# Patient Record
Sex: MALE | Race: WHITE
[De-identification: names, ages, dates, MRNs, and addresses within clinical notes are randomized per-mention and may not be internally consistent; named-entity substitution may affect disease eponyms.]

---

## 2022-02-24 ENCOUNTER — HOSPITAL ENCOUNTER (OUTPATIENT)
Dept: HOSPITAL 47 - LABWHC1 | Age: 22
Discharge: HOME | End: 2022-02-24
Attending: INTERNAL MEDICINE
Payer: COMMERCIAL

## 2022-02-24 DIAGNOSIS — Z00.00: Primary | ICD-10-CM

## 2022-02-24 LAB
ALBUMIN SERPL-MCNC: 5 G/DL (ref 3.8–4.9)
ALBUMIN/GLOB SERPL: 1.99 G/DL (ref 1.6–3.17)
ALP SERPL-CCNC: 60 U/L (ref 41–126)
ALT SERPL-CCNC: 35 U/L (ref 10–49)
ANION GAP SERPL CALC-SCNC: 14.4 MMOL/L (ref 10–18)
AST SERPL-CCNC: 25 U/L (ref 14–35)
BASOPHILS # BLD AUTO: 0.03 X 10*3/UL (ref 0–0.1)
BASOPHILS NFR BLD AUTO: 0.5 %
BUN SERPL-SCNC: 8.6 MG/DL (ref 9–27)
BUN/CREAT SERPL: 10.37 RATIO (ref 12–20)
CALCIUM SPEC-MCNC: 10 MG/DL (ref 8.7–10.3)
CHLORIDE SERPL-SCNC: 100 MMOL/L (ref 96–109)
CHOLEST SERPL-MCNC: 160 MG/DL (ref 0–200)
CO2 SERPL-SCNC: 23.8 MMOL/L (ref 20–27.5)
EOSINOPHIL # BLD AUTO: 0.09 X 10*3/UL (ref 0.04–0.35)
EOSINOPHIL NFR BLD AUTO: 1.4 %
ERYTHROCYTE [DISTWIDTH] IN BLOOD BY AUTOMATED COUNT: 5.29 X 10*6/UL (ref 4.4–5.6)
ERYTHROCYTE [DISTWIDTH] IN BLOOD: 13 % (ref 11.5–14.5)
GLOBULIN SER CALC-MCNC: 2.5 G/DL (ref 1.6–3.3)
GLUCOSE SERPL-MCNC: 87 MG/DL (ref 70–110)
HCT VFR BLD AUTO: 46.1 % (ref 39.6–50)
HDLC SERPL-MCNC: 29.1 MG/DL (ref 40–60)
HGB BLD-MCNC: 15.7 G/DL (ref 13–17)
IMM GRANULOCYTES BLD QL AUTO: 0.2 %
LDLC SERPL CALC-MCNC: 115.1 MG/DL (ref 0–131)
LYMPHOCYTES # SPEC AUTO: 2.58 X 10*3/UL (ref 0.9–5)
LYMPHOCYTES NFR SPEC AUTO: 39 %
MCH RBC QN AUTO: 29.7 PG (ref 27–32)
MCHC RBC AUTO-ENTMCNC: 34.1 G/DL (ref 32–37)
MCV RBC AUTO: 87.1 FL (ref 80–97)
MONOCYTES # BLD AUTO: 0.64 X 10*3/UL (ref 0.2–1)
MONOCYTES NFR BLD AUTO: 9.7 %
NEUTROPHILS # BLD AUTO: 3.27 X 10*3/UL (ref 1.8–7.7)
NEUTROPHILS NFR BLD AUTO: 49.2 %
NRBC BLD AUTO-RTO: 0 /100 WBCS (ref 0–0)
PLATELET # BLD AUTO: 289 X 10*3/UL (ref 140–440)
POTASSIUM SERPL-SCNC: 3.9 MMOL/L (ref 3.5–5.5)
PROT SERPL-MCNC: 7.5 G/DL (ref 6.2–8.2)
SODIUM SERPL-SCNC: 138 MMOL/L (ref 135–145)
TRIGL SERPL-MCNC: 78.9 MG/DL (ref 0–149)
VLDLC SERPL CALC-MCNC: 15.78 MG/DL (ref 5–40)
WBC # BLD AUTO: 6.62 X 10*3/UL (ref 4.5–10)

## 2022-02-24 PROCEDURE — 84443 ASSAY THYROID STIM HORMONE: CPT

## 2022-02-24 PROCEDURE — 80061 LIPID PANEL: CPT

## 2022-02-24 PROCEDURE — 36415 COLL VENOUS BLD VENIPUNCTURE: CPT

## 2022-02-24 PROCEDURE — 85025 COMPLETE CBC W/AUTO DIFF WBC: CPT

## 2022-02-24 PROCEDURE — 80053 COMPREHEN METABOLIC PANEL: CPT

## 2022-05-02 NOTE — P.HPOR
History of Present Illness


H&P Date: 05/02/22


Chief Complaint: Right volar wrist soft tissue mass


Subjective:


This is a 21 year old male that presents today for initial evaluation regarding 

a several month history of a right volar wrist mass that suddenly developed. He 

denies any injury or inciting event. He states it is not painful on a daily 

basis and does not interfere with the function of his hand. He denies any 

paresthesias. 





Physical Examination:





RUE: AIN/PIN/Radial/Ulnar/Median motor intact. Radial/Ulnar/Median SILT. 2+/4 

Radial/Ulnar pulses palpated. 5/5 APB, 5/5 FDI. Negative Finkelsteins, negative 

CMC grind, negative Durkan's compression. 4x3 cm mobile round mass on volar 

radial portion of wrist. No appreciable thrill or pulsation appreciated with 

mass. 





Imaging:


X-Rays of the right hand demonstrate no acute fracture/dislocation. Slight 

irregular contour of nose of scaphoid at STT joint, likely chronic. 





Impression:


1.) Right volar wrist soft tissue mass. 4cm.





Plan:





Diagnosis and treatment options were discussed with the patient. The patient 

states that the mass is becoming increasingly annoying and he would like to have

it excised. Risks and benefits of surgery including bleeding, infection, damage 

to surrounding tissue, need for further surgery, recurrence and risks of 

anesthesia were discussed and he wishes to proceed with surgical intervention 

and will be scheduled for right volar wrist soft tissue mass excision in the 

near future. 








-Diego Krishnamurthy DO


Orthopedic Hand/Upper Extremity Surgeon











Physical Examination


Osteopathic Statement: *.  No significant issues noted on an osteopathic 

structural exam other than those noted in the History and Physical/Consult.

## 2022-05-04 ENCOUNTER — HOSPITAL ENCOUNTER (OUTPATIENT)
Dept: HOSPITAL 47 - OR | Age: 22
Discharge: HOME | End: 2022-05-04
Attending: ORTHOPAEDIC SURGERY
Payer: COMMERCIAL

## 2022-05-04 VITALS — HEART RATE: 71 BPM | DIASTOLIC BLOOD PRESSURE: 86 MMHG | RESPIRATION RATE: 18 BRPM | SYSTOLIC BLOOD PRESSURE: 126 MMHG

## 2022-05-04 VITALS — BODY MASS INDEX: 28.3 KG/M2

## 2022-05-04 VITALS — TEMPERATURE: 97.1 F

## 2022-05-04 DIAGNOSIS — M67.431: Primary | ICD-10-CM

## 2022-05-04 PROCEDURE — 25071 EXC FOREARM LES SC 3 CM/>: CPT

## 2022-05-04 PROCEDURE — 88304 TISSUE EXAM BY PATHOLOGIST: CPT

## 2022-05-04 NOTE — P.OP
Date of Procedure: 05/04/22


Preoperative Diagnosis: 


Right volar wrist soft tissue mass 4x3cm


Postoperative Diagnosis: 


Right volar wrist soft tissue mass 4x3cm


Procedure(s) Performed: 


Right volar wrist soft tissue mass excision 4x3cm


Anesthesia: GHADAA


Surgeon: Diego Krishnamurthy


Estimated Blood Loss (ml): 10


Pathology: other (Right wrist volar soft tissue mass)


Condition: stable


Disposition: PACU


Description of Procedure: 


This is a 21 year old male who presents today for a right volar soft tissue mass

excision after having failed conservative treatment. Risks and benefits of 

surgery were discussed with the patient including bleeding, damage to 

surrounding tissue, infection, need for further surgery, recurrence, as well as 

risks of anesthesia including pulmonary embolism and even death and the patient 

wished to proceed with surgical intervention. The patient was seen in the pre-

operative area by myself. Consent and H&P were completed and updated. The 

correct extremity was marked in the pre-operative area by myself and all other 

questions were answered. 





Operative Narrative:


   The patient was brought to the operating room by the department of 

anesthesia. They remained on the portable stretcher and a rolling hand table was

brought to the side of the operative extremity. Pre-operative time out was 

performed indicating the correct patient, procedure and laterality. All in the 

room agreed. Pre-operative antibiotics were given prior to skin incision. The 

patient was then drifted off to sleep by the department of anesthesia. A 

nonsterile tourniquet was then applied to the operative extremity and the right 

upper extremity was then prepped and draped in normal sterile fashion.  The 

operative extremity was the exsanguinated with an esmarch bandage and the 

tourniquet was inflated to 250mmHg.  





A longitudinal incision was made over the volar aspect of the wrist overlying 

the prominent soft tissue mass located on a volar radial portion of the wrist. 

Blunt dissection was taken down through subcutaneous tissues taking care to 

protect the  radial artery and surrounding branches. After subcutaneous 

dissection was performed a  4x3cm multilobulated clear gelatinous mass was 

identified and encased intimately around the radial artery. The mass was 

carefully dissected out and direct communication of the stalk was followed down 

to the radiocarpal joint. A small portion of volar capsule was excised at the 

origin of the stalk. Bovie cautery was used to cauterize the origin of the stalk

taking care to preserve the volar wrist ligaments. The mass was collected and 

sent to pathology. Tourniquet was then let down and no pulsatile bleeding was 

appreciated. Minor superficial venous bleeding was controlled with bovie 

cautery. Tourniquet was then re-inflated for closing. The wound was then closed 

with 4-0 Monocryl suture followed by mastisol and steri strips and 10cc's of 0.5

% bupivicaine was injected into the surgical area. A soft dressing consisting of

4x4s, cast padding and an ace wrap was applied. Tourniquet was let down and the 

digits had immediate normal perfusion. Gabriele ALVAREZ was present for the case 

in it's entirety to assist in retraction and protection of vital neurovascular 

structures. The patient was then transferred to PACU in stable condition.  








Diego Krishnamurthy D.O.


Orthopedic Hand/Upper Extremity Surgeon

## 2023-03-16 ENCOUNTER — HOSPITAL ENCOUNTER (OUTPATIENT)
Dept: HOSPITAL 47 - LABWHC1 | Age: 23
Discharge: HOME | End: 2023-03-16
Attending: INTERNAL MEDICINE
Payer: COMMERCIAL

## 2023-03-16 DIAGNOSIS — Z00.00: Primary | ICD-10-CM

## 2023-03-16 LAB
ALBUMIN SERPL-MCNC: 5.2 G/DL (ref 3.8–4.9)
ALBUMIN/GLOB SERPL: 2.29 G/DL (ref 1.6–3.17)
ALP SERPL-CCNC: 70 U/L (ref 41–126)
ALT SERPL-CCNC: 31 U/L (ref 10–49)
ANION GAP SERPL CALC-SCNC: 12.8 MMOL/L (ref 10–18)
AST SERPL-CCNC: 24 U/L (ref 14–35)
BASOPHILS # BLD AUTO: 0.03 X 10*3/UL (ref 0–0.1)
BASOPHILS NFR BLD AUTO: 0.4 %
BUN SERPL-SCNC: 11.6 MG/DL (ref 9–27)
BUN/CREAT SERPL: 13.57 RATIO (ref 12–20)
CALCIUM SPEC-MCNC: 10.2 MG/DL (ref 8.7–10.3)
CHLORIDE SERPL-SCNC: 102 MMOL/L (ref 96–109)
CHOLEST SERPL-MCNC: 168 MG/DL (ref 0–200)
CO2 SERPL-SCNC: 27.7 MMOL/L (ref 20–27.5)
EOSINOPHIL # BLD AUTO: 0.1 X 10*3/UL (ref 0.04–0.35)
EOSINOPHIL NFR BLD AUTO: 1.3 %
ERYTHROCYTE [DISTWIDTH] IN BLOOD BY AUTOMATED COUNT: 5.45 X 10*6/UL (ref 4.4–5.6)
ERYTHROCYTE [DISTWIDTH] IN BLOOD: 12.4 % (ref 11.5–14.5)
GLOBULIN SER CALC-MCNC: 2.3 G/DL (ref 1.6–3.3)
GLUCOSE SERPL-MCNC: 83 MG/DL (ref 70–110)
HCT VFR BLD AUTO: 47.6 % (ref 39.6–50)
HDLC SERPL-MCNC: 35 MG/DL (ref 40–60)
HGB BLD-MCNC: 16.1 G/DL (ref 13–17)
IMM GRANULOCYTES BLD QL AUTO: 0.1 %
LDLC SERPL CALC-MCNC: 119 MG/DL (ref 0–131)
LYMPHOCYTES # SPEC AUTO: 3.12 X 10*3/UL (ref 0.9–5)
LYMPHOCYTES NFR SPEC AUTO: 41.5 %
MCH RBC QN AUTO: 29.5 PG (ref 27–32)
MCHC RBC AUTO-ENTMCNC: 33.8 G/DL (ref 32–37)
MCV RBC AUTO: 87.3 FL (ref 80–97)
MONOCYTES # BLD AUTO: 0.66 X 10*3/UL (ref 0.2–1)
MONOCYTES NFR BLD AUTO: 8.8 %
NEUTROPHILS # BLD AUTO: 3.59 X 10*3/UL (ref 1.8–7.7)
NEUTROPHILS NFR BLD AUTO: 47.9 %
NRBC BLD AUTO-RTO: 0 /100 WBCS (ref 0–0)
PLATELET # BLD AUTO: 275 X 10*3/UL (ref 140–440)
POTASSIUM SERPL-SCNC: 4.3 MMOL/L (ref 3.5–5.5)
PROT SERPL-MCNC: 7.4 G/DL (ref 6.2–8.2)
SODIUM SERPL-SCNC: 142 MMOL/L (ref 135–145)
TRIGL SERPL-MCNC: 69.8 MG/DL (ref 0–149)
VLDLC SERPL CALC-MCNC: 13.96 MG/DL (ref 5–40)
WBC # BLD AUTO: 7.51 X 10*3/UL (ref 4.5–10)

## 2023-03-16 PROCEDURE — 85025 COMPLETE CBC W/AUTO DIFF WBC: CPT

## 2023-03-16 PROCEDURE — 84443 ASSAY THYROID STIM HORMONE: CPT

## 2023-03-16 PROCEDURE — 80061 LIPID PANEL: CPT

## 2023-03-16 PROCEDURE — 36415 COLL VENOUS BLD VENIPUNCTURE: CPT

## 2023-03-16 PROCEDURE — 80053 COMPREHEN METABOLIC PANEL: CPT

## 2023-03-16 PROCEDURE — 86803 HEPATITIS C AB TEST: CPT
